# Patient Record
Sex: FEMALE | HISPANIC OR LATINO | Employment: FULL TIME | URBAN - METROPOLITAN AREA
[De-identification: names, ages, dates, MRNs, and addresses within clinical notes are randomized per-mention and may not be internally consistent; named-entity substitution may affect disease eponyms.]

---

## 2019-10-24 ENCOUNTER — HOSPITAL ENCOUNTER (EMERGENCY)
Facility: CLINIC | Age: 39
Discharge: HOME | End: 2019-10-24
Attending: EMERGENCY MEDICINE | Admitting: EMERGENCY MEDICINE
Payer: COMMERCIAL

## 2019-10-24 ENCOUNTER — APPOINTMENT (OUTPATIENT)
Dept: GENERAL RADIOLOGY | Facility: CLINIC | Age: 39
End: 2019-10-24
Attending: EMERGENCY MEDICINE
Payer: COMMERCIAL

## 2019-10-24 VITALS
HEIGHT: 59 IN | OXYGEN SATURATION: 100 % | SYSTOLIC BLOOD PRESSURE: 100 MMHG | TEMPERATURE: 97.8 F | BODY MASS INDEX: 32.25 KG/M2 | HEART RATE: 64 BPM | DIASTOLIC BLOOD PRESSURE: 52 MMHG | RESPIRATION RATE: 16 BRPM | WEIGHT: 160 LBS

## 2019-10-24 DIAGNOSIS — S90.31XA CONTUSION OF RIGHT FOOT, INITIAL ENCOUNTER: ICD-10-CM

## 2019-10-24 PROCEDURE — 99283 EMERGENCY DEPT VISIT LOW MDM: CPT

## 2019-10-24 PROCEDURE — 25000132 ZZH RX MED GY IP 250 OP 250 PS 637: Performed by: EMERGENCY MEDICINE

## 2019-10-24 PROCEDURE — 73630 X-RAY EXAM OF FOOT: CPT | Mod: RT

## 2019-10-24 PROCEDURE — 99284 EMERGENCY DEPT VISIT MOD MDM: CPT

## 2019-10-24 RX ORDER — ACETAMINOPHEN 500 MG
1000 TABLET ORAL ONCE
Status: COMPLETED | OUTPATIENT
Start: 2019-10-24 | End: 2019-10-24

## 2019-10-24 RX ORDER — IBUPROFEN 600 MG/1
600 TABLET, FILM COATED ORAL ONCE
Status: COMPLETED | OUTPATIENT
Start: 2019-10-24 | End: 2019-10-24

## 2019-10-24 RX ADMIN — ACETAMINOPHEN 1000 MG: 500 TABLET, FILM COATED ORAL at 14:04

## 2019-10-24 RX ADMIN — IBUPROFEN 600 MG: 600 TABLET ORAL at 14:04

## 2019-10-24 SDOH — HEALTH STABILITY: MENTAL HEALTH: HOW OFTEN DO YOU HAVE A DRINK CONTAINING ALCOHOL?: NEVER

## 2019-10-24 ASSESSMENT — MIFFLIN-ST. JEOR: SCORE: 1301.39

## 2019-10-24 ASSESSMENT — ENCOUNTER SYMPTOMS: MYALGIAS: 1

## 2019-10-24 NOTE — ED AVS SNAPSHOT
Emergency Department  [address] [place] [address]  Phone:  [phone]  Fax:  [phone]                                    Angelique Rizzo   MRN: [id number]    Department:   Emergency Department   Date of Visit:  10/24/2019           After Visit Summary Signature Page    I have received my discharge instructions, and my questions have been answered. I have discussed any challenges I see with this plan with the nurse or doctor.    ..........................................................................................................................................  Patient/Patient Representative Signature      ..........................................................................................................................................  Patient Representative Print Name and Relationship to Patient    ..................................................               ................................................  Date                                   Time    ..........................................................................................................................................  Reviewed by Signature/Title    ...................................................              ..............................................  Date                                               Time          22EPIC Rev 08/18

## 2019-10-24 NOTE — ED PROVIDER NOTES
"  History     Chief Complaint:  Foot Pain     HPI   Angelique Rizzo is a 39 year old female who presents with foot pain. Patient reports that she initially developed localized anterior right foot pain since yesterday without trauma or injury. She has tried massage with some improvement, but yesterday, pain significantly worsened. No leg swelling. She went to the clinic earlier today, but they sent her here out of concern for infection versus clot. She notes that the pain was a 9 before arrival, but after tylenol and ibuprofen here in the ED it is down to a 5.      Allergies:  No Known Drug Allergies      Medications:    Medications reviewed. No pertinent medications.     Past Medical History:    Depression     Past Surgical History:    Tubal ligation     Family History:    Mother: thyroid disorder   Sister: thyroid disorder, breast cancer     Social History:  The patient was accompanied to the ED by her daughter.  Smoking Status: Never Smoker  Smokeless Tobacco: Never Used  Alcohol Use: Negative   Drug Use: Negative  Marital Status:       Review of Systems   Cardiovascular: Negative for leg swelling.   Musculoskeletal: Positive for myalgias.   All other systems reviewed and are negative.      Physical Exam     Patient Vitals for the past 24 hrs:   BP Temp Temp src Pulse Resp SpO2 Height Weight   10/24/19 1232 100/52 97.8  F (36.6  C) Temporal 64 16 100 % 1.499 m (4' 11\") 72.6 kg (160 lb)     Physical Exam    General: Patient in mild distress.  Alert and cooperative with exam. Normal mentation  HEENT: NC/AT. Conjunctiva without injection or scleral icterus. External ears normal.  Respiratory: Breathing comfortably on room air  CV: Normal rate, all extremities well perfused  GI:  Non-distended abdomen  Skin: Warm, dry, no rashes/open wounds on exposed skin  Musculoskeletal: RLE: CMS intact mild bruising/swelling to the dorsal aspect of the foot just proximal to the second toe. Tender to palpation. No " calf swelling or tenderness bilaterally.  Neuro: Alert, answers questions appropriately. No gross motor deficits    Emergency Department Course     Imaging:  Radiology findings were communicated with the patient who voiced understanding of the findings.    Foot  XR, G/E 3 views, right  Negative exam.  HUAN TORRES MD  Reading per radiology      Interventions:  1404 Tylenol 1000 mg Oral   Ibuprofen 600 mg  Oral     Emergency Department Course:    1359 Nursing notes and vitals reviewed.    1409 The patient was sent for a XR while in the emergency department, results above.      1428 I performed an exam of the patient as documented above.      The patient is discharged to home.     Impression & Plan      Medical Decision Making:  Patient is a 39-year-old female who presents with right foot atraumatic foot pain and mild localized swelling.  Patient's medical history and records were reviewed.  Initial consideration for, but not limited to, occult fracture, dislocation, soft tissue injury, infections process, inflammatory process among others.  X-ray unremarkable as noted above.  Informal bedside ultrasound demonstrates evidence of likely small hematoma which is exquisitely tender to palpation.  Patient has no other findings concerning for DVT, cellulitis, or other emergent pathology.  Recommended supportive care including icing, Tylenol/ibuprofen, and provided postoperative shoe for additional comfort.  Patient to follow-up closely with PCP if not improving in the next 5 to 7 days.  Return precautions discussed.  Patient discharged home.    Diagnosis:    ICD-10-CM    1. Contusion of right foot, initial encounter S90.31XA      Disposition:   Findings and plan explained to the Patient. Patient discharged home with instructions regarding supportive care, medications, and reasons to return. The importance of close follow-up was reviewed.      Scribe Disclosure:  I, Aryan Aparicio, am serving as a scribe at 2:11 PM on  10/24/2019 to document services personally performed by Cedric Van DO based on my observations and the provider's statements to me.       EMERGENCY DEPARTMENT       Cedric Van DO  10/25/19 1509

## 2019-10-24 NOTE — DISCHARGE INSTRUCTIONS
Return to the emergency department or seek medical care as instructed if your symptoms fail to improve or significantly worsen.    Take Acetaminophen (aka Tylenol) and/or ibuprofen (aka Motrin/Advil, 600mg up to 4 times per day with food) as needed for symptom/pain relief; use as directed.    Ice area of pain for 20 minutes four times per day for the next two days      Wear postoperative shoe as needed for comfort    Follow-up as indicated on page 1.  Maintain adequate hydration and get plenty of rest.

## 2020-03-07 ENCOUNTER — HOSPITAL ENCOUNTER (EMERGENCY)
Facility: CLINIC | Age: 40
Discharge: HOME | End: 2020-03-07
Attending: EMERGENCY MEDICINE | Admitting: EMERGENCY MEDICINE
Payer: COMMERCIAL

## 2020-03-07 VITALS
SYSTOLIC BLOOD PRESSURE: 116 MMHG | HEIGHT: 59 IN | TEMPERATURE: 98.7 F | DIASTOLIC BLOOD PRESSURE: 50 MMHG | BODY MASS INDEX: 32.25 KG/M2 | HEART RATE: 100 BPM | RESPIRATION RATE: 20 BRPM | OXYGEN SATURATION: 98 % | WEIGHT: 160 LBS

## 2020-03-07 DIAGNOSIS — J10.1 INFLUENZA A: ICD-10-CM

## 2020-03-07 LAB
DEPRECATED S PYO AG THROAT QL EIA: NEGATIVE
FLUAV+FLUBV AG SPEC QL: NEGATIVE
FLUAV+FLUBV AG SPEC QL: POSITIVE
SPECIMEN SOURCE: ABNORMAL
SPECIMEN SOURCE: NORMAL
SPECIMEN SOURCE: NORMAL
STREP GROUP A PCR: NOT DETECTED

## 2020-03-07 PROCEDURE — 25000132 ZZH RX MED GY IP 250 OP 250 PS 637: Performed by: EMERGENCY MEDICINE

## 2020-03-07 PROCEDURE — 25000128 H RX IP 250 OP 636: Performed by: EMERGENCY MEDICINE

## 2020-03-07 PROCEDURE — 87804 INFLUENZA ASSAY W/OPTIC: CPT | Performed by: EMERGENCY MEDICINE

## 2020-03-07 PROCEDURE — 99283 EMERGENCY DEPT VISIT LOW MDM: CPT

## 2020-03-07 PROCEDURE — 40001204 ZZHCL STATISTIC STREP A RAPID: Performed by: EMERGENCY MEDICINE

## 2020-03-07 PROCEDURE — 87651 STREP A DNA AMP PROBE: CPT | Performed by: EMERGENCY MEDICINE

## 2020-03-07 RX ORDER — ACETAMINOPHEN 500 MG
1000 TABLET ORAL ONCE
Status: COMPLETED | OUTPATIENT
Start: 2020-03-07 | End: 2020-03-07

## 2020-03-07 RX ORDER — ONDANSETRON 4 MG/1
4 TABLET, ORALLY DISINTEGRATING ORAL ONCE
Status: COMPLETED | OUTPATIENT
Start: 2020-03-07 | End: 2020-03-07

## 2020-03-07 RX ORDER — IBUPROFEN 600 MG/1
600 TABLET, FILM COATED ORAL ONCE
Status: COMPLETED | OUTPATIENT
Start: 2020-03-07 | End: 2020-03-07

## 2020-03-07 RX ORDER — OSELTAMIVIR PHOSPHATE 75 MG/1
75 CAPSULE ORAL 2 TIMES DAILY
Qty: 10 CAPSULE | Refills: 0 | Status: SHIPPED | OUTPATIENT
Start: 2020-03-07 | End: 2020-03-12

## 2020-03-07 RX ADMIN — ONDANSETRON 4 MG: 4 TABLET, ORALLY DISINTEGRATING ORAL at 01:11

## 2020-03-07 RX ADMIN — ACETAMINOPHEN 1000 MG: 500 TABLET, FILM COATED ORAL at 01:11

## 2020-03-07 RX ADMIN — IBUPROFEN 600 MG: 600 TABLET ORAL at 01:12

## 2020-03-07 ASSESSMENT — ENCOUNTER SYMPTOMS
MYALGIAS: 1
HEADACHES: 1
COUGH: 1
FEVER: 1

## 2020-03-07 ASSESSMENT — MIFFLIN-ST. JEOR: SCORE: 1301.39

## 2020-03-07 NOTE — ED PROVIDER NOTES
"  History     Chief Complaint:    Headache and Generalized Body Aches    The history is provided by the patient.      Angelique Rizzo is a 39 year old female who presents with a headache and myalgias. The patient states that her symptoms began yesterday. She endorses a cough, congestion, and subjective fevers as well. The patient denies any recent international travel and has had no known sick contacts. She did not receive a flu shot this year. The patient has been managing her symptoms with Tylenol and ibuprofen, both of which she last had at 1800 this evening.    Allergies:  No Known Drug Allergies     Medications:    The patient is currently on no regular medications.    Past Medical History:    Sepsis  Acute pyelonephritis   Normocytic anemia  Depression  Self-harm/suicidal gesture    Past Surgical History:    Tubal ligation     Family History:    Thyroid disorder - mother  Breast cancer - sister  Thyroid disorder - sister    Social History:  Negative for tobacco use.  Negative for alcohol use.  Negative for drug use.  Marital Status:   [2]    Review of Systems   Constitutional: Positive for fever (subjective).   HENT: Positive for congestion.    Respiratory: Positive for cough.    Musculoskeletal: Positive for myalgias.   Neurological: Positive for headaches.   All other systems reviewed and are negative.        Physical Exam     Patient Vitals for the past 24 hrs:   BP Temp Temp src Pulse Resp SpO2 Height Weight   03/07/20 0029 116/50 98.7  F (37.1  C) Temporal 100 20 98 % 1.499 m (4' 11\") 72.6 kg (160 lb)     Physical Exam  General: Appears well-developed and well-nourished.   Head: No signs of trauma.   Mouth/Throat: Oropharynx is clear and moist.   Eyes: Conjunctivae are normal.   Neck: Normal range of motion. No nuchal rigidity. No cervical adenopathy  CV: Normal rate and regular rhythm.    Resp: Effort normal and breath sounds normal. No respiratory distress.   GI: Soft. There is no " tenderness.  No rebound or guarding.  Normal bowel sounds.   MSK: Normal range of motion.   Neuro: The patient is alert and oriented to person, place, and time.  PERRLA, EOMI, strength in upper/lower extremities normal and symmetrical.   Sensation normal. Speech normal.  GCS 15  Skin: Skin is warm and dry. No rash noted.   Psych: normal mood and affect. behavior is normal.       Emergency Department Course     Laboratory:  Laboratory findings were communicated with the patient who voiced understanding of the findings.    Influenza A/B antigen: Influenza A positive  Streptococcus A Rapid Scr w Reflx to PCR: negative  Group A Streptococcus PCR Throat Swab: Pending    Interventions:  0111: Tylenol 1,000 mg PO  0111: Zofran-ODT 4 mg PO  0112: ibuprofen 600 mg PO    Emergency Department Course:  Past medical records, nursing notes, and vitals reviewed.    0046: I performed an exam of the patient as documented above.     0155: I rechecked the patient and discussed the results of her workup thus far.     I personally reviewed the laboratory results with the Patient and answered all related questions prior to discharge.     Findings and plan explained to the Patient. Patient discharged home with instructions regarding supportive care, medications, and reasons to return. The importance of close follow-up was reviewed.     Impression & Plan     Medical Decision Making:  Angelique Rizzo is 39-year-old woman presents due to subjective fever, headache, congestion, generally not feeling well. Symptoms began yesterday.  On my evaluation, she is not in any respiratory distress and her lung sounds were clear.  Influenza swab was obtained that was positive and her symptoms are consistent with influenza.  She was given Tylenol and ibuprofen with improvement of her symptoms.  I feel that she is appropriate for discharge home.  I did give a prescription for Tamiflu and recommended continued use of Tylenol and ibuprofen along with  pushing plenty of fluids.  She was instructed to follow up with her primary care doctor.    Diagnosis:    ICD-10-CM   1. Influenza A J10.1     Disposition:  Discharged to home.    Discharge Medications:  New Prescriptions    OSELTAMIVIR (TAMIFLU) 75 MG CAPSULE    Take 1 capsule (75 mg) by mouth 2 times daily for 5 days     Scribe Disclosure:  Carly HUBBARD, am serving as a scribe at 12:38 AM on 3/7/2020 to document services personally performed by Manuel Almaraz MD based on my observations and the provider's statements to me.     Carly Sumner  3/7/2020    EMERGENCY DEPARTMENT       Manuel Almaraz MD  03/07/20 0514

## 2020-03-07 NOTE — ED AVS SNAPSHOT
Emergency Department  [address] [facility] [address]  Phone:  [phone]  Fax:  [phone]                                    Angelique Rizzo   MRN: [id number]    Department:   Emergency Department   Date of Visit:  3/7/2020           After Visit Summary Signature Page    I have received my discharge instructions, and my questions have been answered. I have discussed any challenges I see with this plan with the nurse or doctor.    ..........................................................................................................................................  Patient/Patient Representative Signature      ..........................................................................................................................................  Patient Representative Print Name and Relationship to Patient    ..................................................               ................................................  Date                                   Time    ..........................................................................................................................................  Reviewed by Signature/Title    ...................................................              ..............................................  Date                                               Time          22EPIC Rev 08/18

## 2020-03-08 ENCOUNTER — OFFICE VISIT (OUTPATIENT)
Dept: URGENT CARE | Facility: URGENT CARE | Age: 40
End: 2020-03-08
Payer: COMMERCIAL

## 2020-03-08 VITALS
RESPIRATION RATE: 22 BRPM | HEART RATE: 96 BPM | WEIGHT: 160 LBS | SYSTOLIC BLOOD PRESSURE: 110 MMHG | OXYGEN SATURATION: 99 % | BODY MASS INDEX: 32.32 KG/M2 | DIASTOLIC BLOOD PRESSURE: 76 MMHG | TEMPERATURE: 100 F

## 2020-03-08 DIAGNOSIS — J11.1 INFLUENZA: Primary | ICD-10-CM

## 2020-03-08 PROCEDURE — 99203 OFFICE O/P NEW LOW 30 MIN: CPT | Performed by: FAMILY MEDICINE

## 2020-03-08 ASSESSMENT — ENCOUNTER SYMPTOMS
RHINORRHEA: 1
BACK PAIN: 1
FEVER: 0
NAUSEA: 0
HEADACHES: 1
VOMITING: 0
CHILLS: 0
SHORTNESS OF BREATH: 0
COUGH: 1
DIARRHEA: 0
SORE THROAT: 0

## 2020-03-09 NOTE — PROGRESS NOTES
SUBJECTIVE:   Angelique Rizzo is a 39 year old female presenting with a chief complaint of   Chief Complaint   Patient presents with     Cough     seen in ER and yesterday with INF, having cough,headache        Back, headache bilateral and frontal and into neck 2 days   Cough x 2 days.     Tylenol about an hour ago   Influenza positive in the ER yesterday.   Has taken 4 doses of tamiflu so far.     Review of Systems   Constitutional: Negative for chills and fever.   HENT: Positive for rhinorrhea. Negative for congestion, ear pain and sore throat.    Respiratory: Positive for cough. Negative for shortness of breath.    Gastrointestinal: Negative for diarrhea, nausea and vomiting.   Musculoskeletal: Positive for back pain.   Neurological: Positive for headaches.       No past medical history on file.  No family history on file.  Current Outpatient Medications   Medication Sig Dispense Refill     oseltamivir (TAMIFLU) 75 MG capsule Take 1 capsule (75 mg) by mouth 2 times daily for 5 days 10 capsule 0     NO ACTIVE MEDICATIONS        Social History     Tobacco Use     Smoking status: Never Smoker     Smokeless tobacco: Never Used   Substance Use Topics     Alcohol use: Never     Frequency: Never       OBJECTIVE  /76 (Cuff Size: Adult Regular)   Pulse 96   Temp 100  F (37.8  C) (Oral)   Resp 22   Wt 72.6 kg (160 lb)   LMP 03/01/2020 (Approximate)   SpO2 99%   BMI 32.32 kg/m      Physical Exam  HENT:      Head: Normocephalic and atraumatic.      Right Ear: External ear normal.      Left Ear: External ear normal.      Nose: Nose normal.      Mouth/Throat:      Pharynx: No oropharyngeal exudate.   Eyes:      General: No scleral icterus.        Right eye: No discharge.         Left eye: No discharge.      Conjunctiva/sclera: Conjunctivae normal.      Pupils: Pupils are equal, round, and reactive to light.   Neck:      Musculoskeletal: Neck supple.      Thyroid: No thyromegaly.      Trachea: No tracheal  deviation.   Cardiovascular:      Rate and Rhythm: Normal rate and regular rhythm.      Heart sounds: Normal heart sounds. No murmur. No friction rub. No gallop.    Pulmonary:      Effort: Pulmonary effort is normal. No respiratory distress.      Breath sounds: Normal breath sounds. No stridor. No wheezing or rales.   Chest:      Chest wall: No tenderness.   Abdominal:      General: Bowel sounds are normal. There is no distension.      Palpations: Abdomen is soft. There is no mass.      Tenderness: There is no abdominal tenderness. There is no guarding or rebound.   Musculoskeletal:         General: No tenderness or deformity.   Lymphadenopathy:      Cervical: No cervical adenopathy.   Skin:     General: Skin is warm and dry.      Findings: No erythema or rash.   Neurological:      Mental Status: She is alert and oriented to person, place, and time.      Cranial Nerves: No cranial nerve deficit.   Psychiatric:         Judgment: Judgment normal.         ICD-10-CM    1. Influenza  J11.1         PLAN  Rest, fluids and ADAT  Finish tamiflu as prescribed already   Patient educational/instructional material provided including reasons for follow-up   Marc Finch MD

## 2022-01-09 ENCOUNTER — HOSPITAL ENCOUNTER (EMERGENCY)
Facility: CLINIC | Age: 42
Discharge: HOME | End: 2022-01-09
Attending: EMERGENCY MEDICINE | Admitting: EMERGENCY MEDICINE

## 2022-01-09 ENCOUNTER — APPOINTMENT (OUTPATIENT)
Dept: GENERAL RADIOLOGY | Facility: CLINIC | Age: 42
End: 2022-01-09
Attending: EMERGENCY MEDICINE

## 2022-01-09 VITALS
TEMPERATURE: 98.1 F | WEIGHT: 160 LBS | RESPIRATION RATE: 16 BRPM | HEART RATE: 80 BPM | SYSTOLIC BLOOD PRESSURE: 117 MMHG | OXYGEN SATURATION: 98 % | HEIGHT: 59 IN | DIASTOLIC BLOOD PRESSURE: 78 MMHG | BODY MASS INDEX: 32.25 KG/M2

## 2022-01-09 DIAGNOSIS — S52.501A CLOSED FRACTURE OF DISTAL END OF RIGHT RADIUS, UNSPECIFIED FRACTURE MORPHOLOGY, INITIAL ENCOUNTER: ICD-10-CM
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
## 2022-01-09 PROCEDURE — 96375 TX/PRO/DX INJ NEW DRUG ADDON: CPT

## 2022-01-09 PROCEDURE — 29125 APPL SHORT ARM SPLINT STATIC: CPT | Mod: RT

## 2022-01-09 PROCEDURE — 96374 THER/PROPH/DIAG INJ IV PUSH: CPT

## 2022-01-09 PROCEDURE — 99285 EMERGENCY DEPT VISIT HI MDM: CPT | Mod: 25

## 2022-01-09 PROCEDURE — 73110 X-RAY EXAM OF WRIST: CPT | Mod: RT

## 2022-01-09 PROCEDURE — 250N000011 HC RX IP 250 OP 636: Performed by: EMERGENCY MEDICINE

## 2022-01-09 RX ORDER — ONDANSETRON 2 MG/ML
INJECTION INTRAMUSCULAR; INTRAVENOUS
Status: DISCONTINUED
Start: 2022-01-09 | End: 2022-01-10 | Stop reason: HOSPADM

## 2022-01-09 RX ORDER — ONDANSETRON 4 MG/1
4 TABLET, ORALLY DISINTEGRATING ORAL EVERY 8 HOURS PRN
Qty: 10 TABLET | Refills: 0 | Status: SHIPPED | OUTPATIENT
Start: 2022-01-09 | End: 2022-01-12

## 2022-01-09 RX ORDER — HYDROCODONE BITARTRATE AND ACETAMINOPHEN 5; 325 MG/1; MG/1
1-2 TABLET ORAL EVERY 4 HOURS PRN
Qty: 15 TABLET | Refills: 0 | Status: SHIPPED | OUTPATIENT
Start: 2022-01-09 | End: 2022-05-31

## 2022-01-09 RX ORDER — HYDROMORPHONE HYDROCHLORIDE 1 MG/ML
0.5 INJECTION, SOLUTION INTRAMUSCULAR; INTRAVENOUS; SUBCUTANEOUS
Status: DISCONTINUED | OUTPATIENT
Start: 2022-01-09 | End: 2022-01-10 | Stop reason: HOSPADM

## 2022-01-09 RX ORDER — ONDANSETRON 2 MG/ML
4 INJECTION INTRAMUSCULAR; INTRAVENOUS EVERY 30 MIN PRN
Status: DISCONTINUED | OUTPATIENT
Start: 2022-01-09 | End: 2022-01-10 | Stop reason: HOSPADM

## 2022-01-09 RX ADMIN — HYDROMORPHONE HYDROCHLORIDE 0.5 MG: 1 INJECTION, SOLUTION INTRAMUSCULAR; INTRAVENOUS; SUBCUTANEOUS at 21:58

## 2022-01-09 RX ADMIN — ONDANSETRON 4 MG: 2 INJECTION INTRAMUSCULAR; INTRAVENOUS at 22:42

## 2022-01-09 ASSESSMENT — MIFFLIN-ST. JEOR: SCORE: 1291.39

## 2022-01-09 ASSESSMENT — ENCOUNTER SYMPTOMS
WEAKNESS: 0
NECK PAIN: 0
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
## 2022-02-25 ENCOUNTER — TRANSFERRED RECORDS (OUTPATIENT)
Dept: HEALTH INFORMATION MANAGEMENT | Facility: CLINIC | Age: 42
End: 2022-02-25

## 2022-02-25 LAB
CHOLESTEROL (EXTERNAL): 159 MG/DL (ref 0–199)
HBA1C MFR BLD: 5.8 %
HDLC SERPL-MCNC: 47 MG/DL
HPV ABSTRACT: NORMAL
LDL CHOLESTEROL (EXTERNAL): 98 MG/DL
NON HDL CHOLESTEROL (EXTERNAL): 112 MG/DL
PAP-ABSTRACT: NORMAL
TRIGLYCERIDES (EXTERNAL): 70 MG/DL
TSH SERPL-ACNC: 1.04 UIU/ML (ref 0.3–4.5)

## 2022-05-31 ENCOUNTER — OFFICE VISIT (OUTPATIENT)
Dept: INTERNAL MEDICINE | Facility: CLINIC | Age: 42
End: 2022-05-31
Payer: COMMERCIAL

## 2022-05-31 VITALS
TEMPERATURE: 97.1 F | BODY MASS INDEX: 31.37 KG/M2 | HEART RATE: 74 BPM | DIASTOLIC BLOOD PRESSURE: 70 MMHG | OXYGEN SATURATION: 99 % | SYSTOLIC BLOOD PRESSURE: 112 MMHG | WEIGHT: 155.3 LBS

## 2022-05-31 DIAGNOSIS — B02.9 HERPES ZOSTER WITHOUT COMPLICATION: Primary | ICD-10-CM

## 2022-05-31 PROCEDURE — 99213 OFFICE O/P EST LOW 20 MIN: CPT | Performed by: INTERNAL MEDICINE

## 2022-05-31 RX ORDER — CYCLOBENZAPRINE HCL 10 MG
TABLET ORAL
COMMUNITY
Start: 2022-05-24 | End: 2022-06-03

## 2022-05-31 RX ORDER — VALACYCLOVIR HYDROCHLORIDE 1 G/1
1000 TABLET, FILM COATED ORAL 3 TIMES DAILY
Qty: 21 TABLET | Refills: 0 | Status: SHIPPED | OUTPATIENT
Start: 2022-05-31

## 2022-05-31 RX ORDER — PREDNISONE 10 MG/1
TABLET ORAL
Qty: 20 TABLET | Refills: 0 | Status: SHIPPED | OUTPATIENT
Start: 2022-05-31

## 2022-05-31 RX ORDER — FERROUS SULFATE 325(65) MG
TABLET ORAL
COMMUNITY
Start: 2021-05-05

## 2022-05-31 NOTE — PROGRESS NOTES
Assessment & Plan     Herpes zoster without complication  Discussed anatomy and pathophysiology of this condition. Handout on condition given in Thai per her preference. Will treat with Valtrex and tapering course of prednisone.  - valACYclovir (VALTREX) 1000 mg tablet; Take 1 tablet (1,000 mg) by mouth 3 times daily  - predniSONE (DELTASONE) 10 MG tablet; 4 tabs daily for 2 days, then 3 tabs daily for 2 days, then 2 tabs daily for 2 days, then 1 tab daily for 2 days, then stop    F/u with regular PCP at regular interval or sooner PRN    Bautista Barragan MD  [facility] is a 42 year old who presents with back pain and rash. Saw provider last week for shoulder pain. XR normal. Over the last couple of days she noticed worsening of pain, burning and needle-like, and now new rash. Rash along back R shoulder and around armpit to chest. Never had this before. Motrin maybe helps with pain.    Review of Systems   Constitutional, derm systems are negative, except as otherwise noted.      Objective    /70   Pulse 74   Temp 97.1  F (36.2  C) (Temporal)   Wt 70.4 kg (155 lb 4.8 oz)   LMP 05/13/2022 (Approximate)   SpO2 99%   BMI 31.37 kg/m    Body mass index is 31.37 kg/m .     Physical Exam   GENERAL: alert and in no distress.  EYES: conjunctivae/corneas clear. EOMs grossly intact  HENT: Facies symmetric.  RESP: No iWOB.  MSK: Moves all four extremities freely  SKIN: Zoster vesicular lesions seen in clusters along R ~T2 dermatome. Some crusted over but most not. No other significant ulcers, lesions, or rashes on the visualized portions of the skin  NEURO: CN II-XII grossly intact.

## 2022-11-22 ENCOUNTER — TELEPHONE (OUTPATIENT)
Dept: INTERNAL MEDICINE | Facility: CLINIC | Age: 42
End: 2022-11-22

## 2022-11-22 NOTE — TELEPHONE ENCOUNTER
Patient Quality Outreach    Patient is due for the following:   Cervical Cancer Screening - PAP Needed    Next Steps:   Chart routed to abstraction.      Type of outreach:    Chart review performed, no outreach needed.    Questions for provider review:    None     NADIA ONEIL    Please abstract the following data from this visit with this patient into the appropriate field in Epic:    Other Tests found in the patient's chart through Chart Review/Care Everywhere:    [facility] and HPV done by [facility] on this date: 02/25/2022

## 2024-10-08 ENCOUNTER — OFFICE VISIT (OUTPATIENT)
Dept: URGENT CARE | Facility: URGENT CARE | Age: 44
End: 2024-10-08
Payer: COMMERCIAL

## 2024-10-08 VITALS
SYSTOLIC BLOOD PRESSURE: 107 MMHG | OXYGEN SATURATION: 100 % | HEART RATE: 64 BPM | RESPIRATION RATE: 18 BRPM | DIASTOLIC BLOOD PRESSURE: 73 MMHG | TEMPERATURE: 97.9 F

## 2024-10-08 DIAGNOSIS — J01.90 ACUTE SINUSITIS WITH SYMPTOMS > 10 DAYS: Primary | ICD-10-CM

## 2024-10-08 DIAGNOSIS — R07.0 THROAT PAIN: ICD-10-CM

## 2024-10-08 LAB
DEPRECATED S PYO AG THROAT QL EIA: NEGATIVE
GROUP A STREP BY PCR: NOT DETECTED

## 2024-10-08 PROCEDURE — 87651 STREP A DNA AMP PROBE: CPT | Performed by: PHYSICIAN ASSISTANT

## 2024-10-08 PROCEDURE — 99213 OFFICE O/P EST LOW 20 MIN: CPT | Performed by: PHYSICIAN ASSISTANT

## 2024-10-08 RX ORDER — BENZONATATE 100 MG/1
100 CAPSULE ORAL 3 TIMES DAILY PRN
Qty: 30 CAPSULE | Refills: 0 | Status: SHIPPED | OUTPATIENT
Start: 2024-10-08

## 2024-10-08 RX ORDER — LIDOCAINE HYDROCHLORIDE 20 MG/ML
15 SOLUTION OROPHARYNGEAL
Qty: 100 ML | Refills: 0 | Status: SHIPPED | OUTPATIENT
Start: 2024-10-08

## 2024-10-08 ASSESSMENT — ENCOUNTER SYMPTOMS
FATIGUE: 0
PALPITATIONS: 0
SORE THROAT: 1
CHILLS: 1
HEADACHES: 1
COUGH: 1
SINUS PRESSURE: 1
SHORTNESS OF BREATH: 0
WHEEZING: 0
SINUS PAIN: 1
RHINORRHEA: 1
CARDIOVASCULAR NEGATIVE: 1
FEVER: 1
NAUSEA: 0
DIARRHEA: 0
VOMITING: 0

## 2024-10-08 NOTE — LETTER
October 8, 2024      Angelique Rizzo  [address] [facility] [address] [facility] [address]        To Whom It May Concern:    Angelique Rizzo was seen in urgent care clinic today and was unable to work today due to her symptoms.  Please feel free to contact me via phone if you have any questions or concerns.        Sincerely,      Jenni Lindsay PA-C

## 2024-10-08 NOTE — PROGRESS NOTES
Cally Merino is a 44 year old, presenting for the following health issues:  Throat Pain (Patient here with complaint of sore throat and pressure behind her eyes. States she went to [place] 9/19 to 10/6. States got sick the day she got to [place] and has been feeling these symptoms the entire time. )    HPI   Acute Illness  Acute illness concerns:   Onset/Duration: 2weeks  Symptoms:  Fever: YES  Chills/Sweats: YES  Headache (location?): YES  Sinus Pressure: YES  Conjunctivitis:  No  Ear Pain: no  Rhinorrhea: YES  Congestion: YES  Sore Throat: YES  Cough: YES-  Wheeze: No  Decreased Appetite: No  Nausea: No  Vomiting: No  Diarrhea: No  Dysuria/Freq.: No  Dysuria or Hematuria: No  Fatigue/Achiness: No  Sick/Strep Exposure: YES- at home  Therapies tried and outcome: rest,fluids,tylenol.ibuprofen with minimal relief    There is no problem list on file for this patient.    No current outpatient medications on file.     No current facility-administered medications for this visit.      No Known Allergies    Review of Systems   Constitutional:  Positive for chills and fever. Negative for fatigue.   HENT:  Positive for congestion, rhinorrhea, sinus pressure, sinus pain and sore throat. Negative for ear pain.    Respiratory:  Positive for cough. Negative for shortness of breath and wheezing.    Cardiovascular: Negative.  Negative for chest pain, palpitations and leg swelling.   Gastrointestinal:  Negative for diarrhea, nausea and vomiting.   Neurological:  Positive for headaches.   All other systems reviewed and are negative.          Objective    /73 (BP Location: Left arm, Patient Position: Sitting, Cuff Size: Adult Regular)   Pulse 64   Temp 97.9  F (36.6  C) (Tympanic)   Resp 18   SpO2 100%   There is no height or weight on file to calculate BMI.  Physical Exam  Vitals and nursing note reviewed.   Constitutional:       General: She is not in acute distress.     Appearance: Normal appearance. She is  well-developed and normal weight. She is not ill-appearing.   HENT:      Head: Normocephalic and atraumatic.      Comments: TMs are intact without any erythema or bulging bilaterally.  Airway is patent.     Nose: Congestion and rhinorrhea present. Rhinorrhea is purulent.      Right Turbinates: Swollen.      Left Turbinates: Swollen.      Right Sinus: Maxillary sinus tenderness and frontal sinus tenderness present.      Left Sinus: Maxillary sinus tenderness and frontal sinus tenderness present.      Mouth/Throat:      Lips: Pink.      Mouth: Mucous membranes are moist.      Pharynx: Oropharynx is clear. Uvula midline. No pharyngeal swelling, oropharyngeal exudate or posterior oropharyngeal erythema.      Tonsils: No tonsillar exudate or tonsillar abscesses.   Eyes:      General: No scleral icterus.     Extraocular Movements: Extraocular movements intact.      Conjunctiva/sclera: Conjunctivae normal.      Pupils: Pupils are equal, round, and reactive to light.   Neck:      Thyroid: No thyromegaly.   Cardiovascular:      Rate and Rhythm: Normal rate and regular rhythm.      Pulses: Normal pulses.      Heart sounds: Normal heart sounds, S1 normal and S2 normal. No murmur heard.     No friction rub. No gallop.   Pulmonary:      Effort: Pulmonary effort is normal. No accessory muscle usage, respiratory distress or retractions.      Breath sounds: Normal breath sounds and air entry. No stridor. No decreased breath sounds, wheezing, rhonchi or rales.   Musculoskeletal:      Cervical back: Normal range of motion and neck supple.   Lymphadenopathy:      Cervical: No cervical adenopathy.   Skin:     General: Skin is warm and dry.      Nails: There is no clubbing.   Neurological:      Mental Status: She is alert and oriented to person, place, and time.   Psychiatric:         Mood and Affect: Mood normal.         Behavior: Behavior normal.         Thought Content: Thought content normal.         Judgment: Judgment normal.         Results for orders placed or performed in visit on 10/08/24 (from the past 24 hour(s))   Streptococcus A Rapid Screen w/Reflex to PCR - Clinic Collect    Specimen: Throat; Swab   Result Value Ref Range    Group A Strep antigen Negative Negative           Assessment/Plan:  Acute sinusitis with symptoms > 10 days:  Will treat with [id number] for sinusitis and take with food/probiotics to minimize GI upset.  Will also give tessalon perles as needed for cough.  Recommend tylenol/ibuprofen prn pain/fever and zyrtec/pseudofed for sinus congestion.   Rest, fluids, chicken soup.  Recheck in clinic if symptoms worsen or if symptoms do not improve.    -     amoxicillin-clavulanate (AUGMENTIN) 875-125 MG tablet; Take 1 tablet by mouth 2 times daily for 10 days.  -     benzonatate (TESSALON) 100 MG capsule; Take 1 capsule (100 mg) by mouth 3 times daily as needed for cough.    Throat pain: RST is negative, will send for strep PCR testing.  Will give lidocaine oral viscous as needed for sore throat.  Recommend tylenol/ibuprofen prn pain/fever, warm salt water gargles, lozenges or cough drops.    -     Streptococcus A Rapid Screen w/Reflex to PCR - Clinic Collect  -     Group A Streptococcus PCR Throat Swab  -     lidocaine, viscous, (XYLOCAINE) 2 % solution; Swish and spit 15 mLs in mouth every 3 hours as needed for pain. ; Max 8 doses/24 hour period.        Jenni Lindsay PA-C